# Patient Record
Sex: MALE | Race: WHITE | NOT HISPANIC OR LATINO | Employment: FULL TIME | URBAN - METROPOLITAN AREA
[De-identification: names, ages, dates, MRNs, and addresses within clinical notes are randomized per-mention and may not be internally consistent; named-entity substitution may affect disease eponyms.]

---

## 2024-04-02 ENCOUNTER — OFFICE VISIT (OUTPATIENT)
Dept: NEUROLOGY | Facility: CLINIC | Age: 49
End: 2024-04-02
Payer: MEDICAID

## 2024-04-02 VITALS
BODY MASS INDEX: 14.96 KG/M2 | WEIGHT: 101 LBS | DIASTOLIC BLOOD PRESSURE: 95 MMHG | HEIGHT: 69 IN | HEART RATE: 104 BPM | SYSTOLIC BLOOD PRESSURE: 118 MMHG

## 2024-04-02 DIAGNOSIS — F41.8 DEPRESSION WITH ANXIETY: ICD-10-CM

## 2024-04-02 DIAGNOSIS — F45.8 HYPERVENTILATION SYNDROME: Primary | ICD-10-CM

## 2024-04-02 PROCEDURE — 1160F RVW MEDS BY RX/DR IN RCRD: CPT | Performed by: PSYCHIATRY & NEUROLOGY

## 2024-04-02 PROCEDURE — 99204 OFFICE O/P NEW MOD 45 MIN: CPT | Performed by: PSYCHIATRY & NEUROLOGY

## 2024-04-02 PROCEDURE — 1159F MED LIST DOCD IN RCRD: CPT | Performed by: PSYCHIATRY & NEUROLOGY

## 2024-04-02 RX ORDER — SERTRALINE HCL 25 MG
0.5 TABLET ORAL DAILY
COMMUNITY
Start: 2024-03-13

## 2024-04-02 RX ORDER — DICYCLOMINE HCL 20 MG
20 TABLET ORAL DAILY
COMMUNITY

## 2024-04-02 RX ORDER — FAMOTIDINE 10 MG
10 TABLET ORAL 2 TIMES DAILY
COMMUNITY

## 2024-04-02 RX ORDER — TRAZODONE HYDROCHLORIDE 100 MG/1
100 TABLET ORAL NIGHTLY
COMMUNITY

## 2024-04-02 NOTE — PROGRESS NOTES
"Chief Complaint  Neurologic Problem (MRI @ [place] & EEG @ @ Dr. Ziegler. )    Subjective          Myron High is a 48 y.o. male who presents to [facility]  History of Present Illness  48-year-old man evaluated for dizziness.  He states that this dizziness has been ongoing for the past year.  He has had more symptoms.  Years ago he hit his head and he did not lose consciousness.  He was wondering if his symptoms were secondary to brain damage.  He had an MRI of the brain which is unremarkable.  He states that the symptoms occur in the morning or in the early afternoon.  Is on a daily basis.  He has a lack of concentration and confusion during the spells.  His spells last anywhere from 2 to 5 minutes.  His wife is with him today.  His wife states that he has increasing anger, depression, anxiety.  He is supposed to see behavioral health in [place].    He states that his spell starts out as dizziness and he gets tunnel vision, and his hearing starts going to him.  Is hard for him to think or to concentrate.  He starts getting shaky and weak.  If he gets up he feels like he is off balance.  He feels like he is going to fall.  He has not correlated his symptoms related to being upset or being anxious however his wife states that he starts getting spells if he gets too much in a hurry.    Objective   Vital Signs:   /95 (BP Location: Right arm, Patient Position: Sitting, Cuff Size: Small Adult)   Pulse 104   Ht 175.3 cm (69\")   Wt 45.8 kg (101 lb)   BMI 14.92 kg/m²     Physical Exam   He is alert, fluent, phasic, follows commands well.  Optic is a normal, visual fields are full, EMs full directions gaze, facial strength is full, soft palate elevation and tongue are normal.  There is no weakness of the upper or lower extremities on individual muscle testing.  Fine finger movements are intact.  Reflexes are symmetrical and decreased.  Cerebellar testing is " intact.  Station gait is able to tiptoe, heel walk, qasim and tandem without difficulty.  Heart is regular and rhythm normal in rate.  I discussed with them regarding hyperventilation and hyperventilation for 1 minute reproduces symptoms as noted above of dizziness, dimming of vision, hearing loss, unsteadiness, tremors and rebreathing in his hand and holding his breath improved symptoms almost immediately.  He states that these are the spells that he is having on a daily basis.        Assessment and Plan  Diagnoses and all orders for this visit:    1. Hyperventilation syndrome (Primary)  Assessment & Plan:  Discussed with him and his wife that his symptoms are secondary to hyperventilatio and sprain to him and his wife regarding carbon dioxide and oxygen balance.  He is to hold his breath or decreases the breathing if he has a spell to abort the spell.  The treatment however is to treat the underlying anxiety disorder and he is to follow-up with behavioral health.  I discussed with him that his MRI of the brain is unremarkable and cannot explain his symptoms.  Thank you for letting me participate in his care.      2. Depression with anxiety  Assessment & Plan:  Discussed with them that if they need second opinion that they can see psychiatry here in [place] or Dr. Carty.           Total time spent with the patient and coordinating patient care was 45 minutes.    Follow Up  No follow-ups on file.  Patient was given instructions and counseling regarding his condition or for health maintenance advice. Please see specific information pulled into the AVS if appropriate.

## 2024-04-02 NOTE — ASSESSMENT & PLAN NOTE
Discussed with him and his wife that his symptoms are secondary to hyperventilatio and sprain to him and his wife regarding carbon dioxide and oxygen balance.  He is to hold his breath or decreases the breathing if he has a spell to abort the spell.  The treatment however is to treat the underlying anxiety disorder and he is to follow-up with behavioral health.  I discussed with him that his MRI of the brain is unremarkable and cannot explain his symptoms.  Thank you for letting me participate in his care.

## 2024-04-02 NOTE — ASSESSMENT & PLAN NOTE
Discussed with them that if they need second opinion that they can see psychiatry here in [place] or Dr. Carty.